# Patient Record
Sex: MALE | Race: ASIAN | URBAN - METROPOLITAN AREA
[De-identification: names, ages, dates, MRNs, and addresses within clinical notes are randomized per-mention and may not be internally consistent; named-entity substitution may affect disease eponyms.]

---

## 2017-06-07 ENCOUNTER — MEDICAL CORRESPONDENCE (OUTPATIENT)
Dept: HEALTH INFORMATION MANAGEMENT | Facility: CLINIC | Age: 22
End: 2017-06-07

## 2017-06-07 ENCOUNTER — TRANSFERRED RECORDS (OUTPATIENT)
Dept: HEALTH INFORMATION MANAGEMENT | Facility: CLINIC | Age: 22
End: 2017-06-07

## 2017-06-19 ENCOUNTER — MEDICAL CORRESPONDENCE (OUTPATIENT)
Dept: HEALTH INFORMATION MANAGEMENT | Facility: CLINIC | Age: 22
End: 2017-06-19

## 2017-06-26 ENCOUNTER — OFFICE VISIT (OUTPATIENT)
Dept: SLEEP MEDICINE | Facility: CLINIC | Age: 22
End: 2017-06-26
Attending: INTERNAL MEDICINE
Payer: COMMERCIAL

## 2017-06-26 VITALS
WEIGHT: 191 LBS | OXYGEN SATURATION: 98 % | RESPIRATION RATE: 18 BRPM | DIASTOLIC BLOOD PRESSURE: 55 MMHG | BODY MASS INDEX: 28.29 KG/M2 | HEART RATE: 71 BPM | HEIGHT: 69 IN | SYSTOLIC BLOOD PRESSURE: 138 MMHG

## 2017-06-26 DIAGNOSIS — R06.83 SNORING: Primary | ICD-10-CM

## 2017-06-26 PROCEDURE — 99211 OFF/OP EST MAY X REQ PHY/QHP: CPT | Mod: ZF
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
## 2017-06-26 NOTE — PATIENT INSTRUCTIONS

## 2017-06-26 NOTE — MR AVS SNAPSHOT
After Visit Summary   6/26/2017    Ely Schulz    MRN: [id number]           Patient Information     Date Of Birth          1995        Visit Information        Provider Department      6/26/2017 12:00 PM Maral Tate MD [facility], [place], Sleep Study        Today's Diagnoses     Snoring    -  1      Care Instructions      Your BMI is Body mass index is 28.21 kg/(m^2).  Weight management is a personal decision.  If you are interested in exploring weight loss strategies, the following discussion covers the approaches that may be successful. Body mass index (BMI) is one way to tell whether you are at a healthy weight, overweight, or obese. It measures your weight in relation to your height.  A BMI of 18.5 to 24.9 is in the healthy range. A person with a BMI of 25 to 29.9 is considered overweight, and someone with a BMI of 30 or greater is considered obese. More than two-thirds of American adults are considered overweight or obese.  Being overweight or obese increases the risk for further weight gain. Excess weight may lead to heart disease and diabetes.  Creating and following plans for healthy eating and physical activity may help you improve your health.  Weight control is part of healthy lifestyle and includes exercise, emotional health, and healthy eating habits. Careful eating habits lifelong are the mainstay of weight control. Though there are significant health benefits from weight loss, long-term weight loss with diet alone may be very difficult to achieve- studies show long-term success with dietary management in less than 10% of people. Attaining a healthy weight may be especially difficult to achieve in those with severe obesity. In some cases, medications, devices and surgical management might be considered.  What can you do?  If you are overweight or obese and are interested in methods for weight loss, you should discuss this with your provider.     Consider  reducing daily calorie intake by 500 calories.     Keep a food journal.     Avoiding skipping meals, consider cutting portions instead.    Diet combined with exercise helps maintain muscle while optimizing fat loss. Strength training is particularly important for building and maintaining muscle mass. Exercise helps reduce stress, increase energy, and improves fitness. Increasing exercise without diet control, however, may not burn enough calories to loose weight.       Start walking three days a week 10-20 minutes at a time    Work towards walking thirty minutes five days a week     Eventually, increase the speed of your walking for 1-2 minutes at time    In addition, we recommend that you review healthy lifestyles and methods for weight loss available through the National Institutes of Health patient information sites:  http://win.niddk.nih.gov/publications/index.htm    And look into health and wellness programs that may be available through your health insurance provider, employer, local community center, or marcela club.    Weight management plan: Patient was referred to their PCP to discuss a diet and exercise plan.              Follow-ups after your visit        Follow-up notes from your care team     Return in about 1 week (around 7/3/2017).      Your next 10 appointments already scheduled     Jul 11, 2017  8:00 PM CDT   PSG Split with SLEEP STUDY RM 2   [facility] [place], Sleep Study ([facility])    [address] [place] [id number] [phone]            Jul 18, 2017 10:00 AM CDT   Return Sleep Patient with Maral Tate MD   [facility], [place], Sleep Study ([facility])    [address] [place] [id number] [phone]              Future tests that were ordered for you today     Open Future Orders        Priority Expected Expires Ordered    Comprehensive Sleep Study Routine   "2017            Who to contact     If you have questions or need follow up information about today's clinic visit or your schedule please contact [facility] directly at [phone].  Normal or non-critical lab and imaging results will be communicated to you by MyChart, letter or phone within 4 business days after the clinic has received the results. If you do not hear from us within 7 days, please contact the clinic through MyChart or phone. If you have a critical or abnormal lab result, we will notify you by phone as soon as possible.  Submit refill requests through [employer] or call your pharmacy and they will forward the refill request to us. Please allow 3 business days for your refill to be completed.          Additional Information About Your Visit        [employer] Information     Mantex lets you send messages to your doctor, view your test results, renew your prescriptions, schedule appointments and more. To sign up, go to [email or web address] . Click on \"Log in\" on the left side of the screen, which will take you to the Welcome page. Then click on \"Sign up Now\" on the right side of the page.     You will be asked to enter the access code listed below, as well as some personal information. Please follow the directions to create your username and password.     Your access code is: [email or web address]  Expires: 2017  1:13 PM     Your access code will  in 90 days. If you need help or a new code, please call your [place] clinic or [phone].        Care EveryWhere ID     This is your Care EveryWhere ID. This could be used by other organizations to access your [place] medical records  [id number]        Your Vitals Were     Pulse Respirations Height Pulse Oximetry BMI (Body Mass Index)       71 18 1.753 m (5' 9\") 98% 28.21 kg/m2        Blood Pressure from Last 3 Encounters:   17 138/55    Weight from Last 3 Encounters:   17 86.6 kg (191 lb)               " Primary Care Provider    None Specified       No primary provider on file.        Equal Access to Services     CAYLA SOUZA : Hadii torrey Wray, tyler handy, holli costa. So [facility] [place] [phone].    ATENCIÓN: Si habla español, tiene a pugh disposición servicios gratuitos de asistencia lingüística. Llame al [phone].    We comply with applicable federal civil rights laws and [place] laws. We do not discriminate on the basis of race, color, national origin, age, disability sex, sexual orientation or gender identity.            Thank you!     Thank you for choosing [facility], SLEEP STUDY  for your care. Our goal is always to provide you with excellent care. Hearing back from our patients is one way we can continue to improve our services. Please take a few minutes to complete the written survey that you may receive in the mail after your visit with us. Thank you!             Your Updated Medication List - Protect others around you: Learn how to safely use, store and throw away your medicines at www.disposemymeds.org.          This list is accurate as of: 6/26/17  1:13 PM.  Always use your most recent med list.                   Brand Name Dispense Instructions for use Diagnosis    cholecalciferol 2000 UNITS tablet      Take 1 tablet by mouth daily 2 tabs per day        MULTIVITAMIN PO      Take by mouth daily 2 tabs per day

## 2017-06-26 NOTE — NURSING NOTE
"Chief Complaint   Patient presents with     Sleep Problem     Headaches through the day,restless sleep       Initial /55  Pulse 71  Resp 18  Ht 1.753 m (5' 9\")  Wt 86.6 kg (191 lb)  SpO2 98%  BMI 28.21 kg/m2 Estimated body mass index is 28.21 kg/(m^2) as calculated from the following:    Height as of this encounter: 1.753 m (5' 9\").    Weight as of this encounter: 86.6 kg (191 lb).  Medication Reconciliation: complete   Neck 40cm  ESS 6/24  Svetlana Varner MA      "

## 2017-07-06 ENCOUNTER — APPOINTMENT (OUTPATIENT)
Dept: GENERAL RADIOLOGY | Facility: CLINIC | Age: 22
End: 2017-07-06
Attending: EMERGENCY MEDICINE
Payer: COMMERCIAL

## 2017-07-06 ENCOUNTER — HOSPITAL ENCOUNTER (EMERGENCY)
Facility: CLINIC | Age: 22
Discharge: HOME | End: 2017-07-07
Attending: EMERGENCY MEDICINE | Admitting: EMERGENCY MEDICINE
Payer: COMMERCIAL

## 2017-07-06 DIAGNOSIS — E87.6 HYPOKALEMIA: ICD-10-CM

## 2017-07-06 DIAGNOSIS — R00.2 PALPITATIONS: ICD-10-CM

## 2017-07-06 LAB
ANION GAP SERPL CALCULATED.3IONS-SCNC: 8 MMOL/L (ref 3–14)
BASOPHILS # BLD AUTO: 0 10E9/L (ref 0–0.2)
BASOPHILS NFR BLD AUTO: 0.1 %
BUN SERPL-MCNC: 16 MG/DL (ref 7–30)
CALCIUM SERPL-MCNC: 9.4 MG/DL (ref 8.5–10.1)
CHLORIDE SERPL-SCNC: 104 MMOL/L (ref 94–109)
CO2 SERPL-SCNC: 25 MMOL/L (ref 20–32)
CREAT SERPL-MCNC: 0.84 MG/DL (ref 0.66–1.25)
DIFFERENTIAL METHOD BLD: NORMAL
EOSINOPHIL # BLD AUTO: 0.1 10E9/L (ref 0–0.7)
EOSINOPHIL NFR BLD AUTO: 1.5 %
ERYTHROCYTE [DISTWIDTH] IN BLOOD BY AUTOMATED COUNT: 11.8 % (ref 10–15)
GFR SERPL CREATININE-BSD FRML MDRD: ABNORMAL ML/MIN/1.7M2
GLUCOSE SERPL-MCNC: 97 MG/DL (ref 70–99)
HCT VFR BLD AUTO: 43.7 % (ref 40–53)
HGB BLD-MCNC: 15.3 G/DL (ref 13.3–17.7)
IMM GRANULOCYTES # BLD: 0 10E9/L (ref 0–0.4)
IMM GRANULOCYTES NFR BLD: 0.1 %
LYMPHOCYTES # BLD AUTO: 2.1 10E9/L (ref 0.8–5.3)
LYMPHOCYTES NFR BLD AUTO: 30.7 %
MCH RBC QN AUTO: 30.5 PG (ref 26.5–33)
MCHC RBC AUTO-ENTMCNC: 35 G/DL (ref 31.5–36.5)
MCV RBC AUTO: 87 FL (ref 78–100)
MONOCYTES # BLD AUTO: 0.4 10E9/L (ref 0–1.3)
MONOCYTES NFR BLD AUTO: 5.6 %
NEUTROPHILS # BLD AUTO: 4.2 10E9/L (ref 1.6–8.3)
NEUTROPHILS NFR BLD AUTO: 62 %
NRBC # BLD AUTO: 0 10*3/UL
NRBC BLD AUTO-RTO: 0 /100
PLATELET # BLD AUTO: 187 10E9/L (ref 150–450)
POTASSIUM SERPL-SCNC: 3 MMOL/L (ref 3.4–5.3)
RBC # BLD AUTO: 5.02 10E12/L (ref 4.4–5.9)
SODIUM SERPL-SCNC: 138 MMOL/L (ref 133–144)
TROPONIN I SERPL-MCNC: NORMAL UG/L (ref 0–0.04)
TSH SERPL DL<=0.005 MIU/L-ACNC: 2.48 MU/L (ref 0.4–4)
WBC # BLD AUTO: 6.8 10E9/L (ref 4–11)

## 2017-07-06 PROCEDURE — 84484 ASSAY OF TROPONIN QUANT: CPT | Performed by: EMERGENCY MEDICINE

## 2017-07-06 PROCEDURE — 84443 ASSAY THYROID STIM HORMONE: CPT | Performed by: EMERGENCY MEDICINE

## 2017-07-06 PROCEDURE — 93010 ELECTROCARDIOGRAM REPORT: CPT | Mod: Z6 | Performed by: EMERGENCY MEDICINE

## 2017-07-06 PROCEDURE — 99285 EMERGENCY DEPT VISIT HI MDM: CPT | Mod: 25 | Performed by: EMERGENCY MEDICINE

## 2017-07-06 PROCEDURE — 25000132 ZZH RX MED GY IP 250 OP 250 PS 637: Performed by: EMERGENCY MEDICINE

## 2017-07-06 PROCEDURE — 93005 ELECTROCARDIOGRAM TRACING: CPT | Performed by: EMERGENCY MEDICINE

## 2017-07-06 PROCEDURE — 80048 BASIC METABOLIC PNL TOTAL CA: CPT | Performed by: EMERGENCY MEDICINE

## 2017-07-06 PROCEDURE — 85025 COMPLETE CBC W/AUTO DIFF WBC: CPT | Performed by: EMERGENCY MEDICINE

## 2017-07-06 PROCEDURE — 71020 XR CHEST 2 VW: CPT

## 2017-07-06 RX ORDER — POTASSIUM CHLORIDE 750 MG/1
40 TABLET, EXTENDED RELEASE ORAL ONCE
Status: COMPLETED | OUTPATIENT
Start: 2017-07-07 | End: 2017-07-07

## 2017-07-06 RX ORDER — LORAZEPAM 0.5 MG/1
1 TABLET ORAL ONCE
Status: COMPLETED | OUTPATIENT
Start: 2017-07-06 | End: 2017-07-06

## 2017-07-06 RX ADMIN — LORAZEPAM 1 MG: 0.5 TABLET ORAL at 23:23

## 2017-07-06 ASSESSMENT — ENCOUNTER SYMPTOMS
BLOOD IN STOOL: 0
PALPITATIONS: 1
SHORTNESS OF BREATH: 1
NUMBNESS: 1
DIAPHORESIS: 1
ANAL BLEEDING: 0
FEVER: 0

## 2017-07-06 NOTE — ED AVS SNAPSHOT
[facility], Emergency Department    [address] [address]    Phone:  [phone]                                       Ely Schulz   MRN: [id number]    Department:  [facility], Emergency Department   Date of Visit:  7/6/2017           Patient Information     Date Of Birth          1995        Your diagnoses for this visit were:     Palpitations        You were seen by Pb Mack MD.        Discharge Instructions       Please call [phone] on Friday to get scheduled for your Holter monitor placement.    Please make an appointment to follow up with [facility] (phone: [phone]) 5-7 days after you turn in the monitor for analysis/results.    Return to the ER for recurrence      Discharge References/Attachments     PALPITATIONS (ENGLISH)      Future Appointments        Provider Department Dept Phone Center    7/11/2017 8:00 PM Sleep Study Room 2 [facility], Sleep Study [phone] [place]    7/18/2017 10:00 AM Maral Tate MD [facility] Sleep Study [phone] [place]      24 Hour Appointment Hotline       To make an appointment at any [place] clinic, call [phone] ([phone]). If you don't have a family doctor or clinic, we will help you find one. [place] clinics are conveniently located to serve the needs of you and your family.          ED Discharge Orders     Zio Patch 48 Hours                    Review of your medicines      Our records show that you are taking the medicines listed below. If these are incorrect, please call your family doctor or clinic.        Dose / Directions Last dose taken    cholecalciferol 2000 UNITS tablet   Dose:  1 tablet        Take 1 tablet by mouth daily 2 tabs per day   Refills:  0        MULTIVITAMIN PO        Take by mouth daily 2 tabs per day   Refills:  0                Procedures and tests performed during your visit     Basic metabolic panel    CBC with platelets  "differential    Cardiac Continuous Monitoring    Drug abuse screen 6 urine (tox)    EKG 12-lead, tracing only    Pulse oximetry nursing    TSH with free T4 reflex    Troponin I    UA reflex to Microscopic and Culture    XR Chest 2 Views      Orders Needing Specimen Collection     None      Pending Results     Date and Time Order Name Status Description    2017 2306 XR Chest 2 Views Preliminary     2017 2246 EKG 12-lead, tracing only Preliminary             Pending Culture Results     No orders found for last 3 day(s).            Pending Results Instructions     If you had any lab results that were not finalized at the time of your Discharge, you can call the ED Lab Result RN at [phone]. You will be contacted by this team for any positive Lab results or changes in treatment. The nurses are available 7 days a week from 10A to 6:30P.  You can leave a message 24 hours per day and they will return your call.        Thank you for choosing [facility]       Thank you for choosing [facility] for your care. Our goal is always to provide you with excellent care. Hearing back from our patients is one way we can continue to improve our services. Please take a few minutes to complete the written survey that you may receive in the mail after you visit with us. Thank you!        [employer] Information     [employer] lets you send messages to your doctor, view your test results, renew your prescriptions, schedule appointments and more. To sign up, go to [email or web address] . Click on \"Log in\" on the left side of the screen, which will take you to the Welcome page. Then click on \"Sign up Now\" on the right side of the page.     You will be asked to enter the access code listed below, as well as some personal information. Please follow the directions to create your username and password.     Your access code is: [email or web address]  Expires: 2017  1:13 PM     Your access code will  in 90 days. If you need help or a new code, " please call your [place] clinic or [phone].        Care EveryWhere ID     This is your Care EveryWhere ID. This could be used by other organizations to access your [place] medical records  [id number]        Equal Access to Services     CAYLA SOUZA : Estelita Wray, wacarrieda luqadaha, qaybta kaalmada gabrielle, holli ga. So [facility] [phone].    ATENCIÓN: Si habla español, tiene a pugh disposición servicios gratuitos de asistencia lingüística. Llame al [phone].    We comply with applicable federal civil rights laws and [place] laws. We do not discriminate on the basis of race, color, national origin, age, disability sex, sexual orientation or gender identity.            After Visit Summary       This is your record. Keep this with you and show to your community pharmacist(s) and doctor(s) at your next visit.

## 2017-07-06 NOTE — ED AVS SNAPSHOT
[facility], [place], Emergency Department    [address] [facility] [address]    Phone:  [phone]                                       Ely Schulz   MRN: [id number]    Department:  [facility], Emergency Department   Date of Visit:  7/6/2017           After Visit Summary Signature Page     I have received my discharge instructions, and my questions have been answered. I have discussed any challenges I see with this plan with the nurse or doctor.    ..........................................................................................................................................  Patient/Patient Representative Signature      ..........................................................................................................................................  Patient Representative Print Name and Relationship to Patient    ..................................................               ................................................  Date                                            Time    ..........................................................................................................................................  Reviewed by Signature/Title    ...................................................              ..............................................  Date                                                            Time

## 2017-07-07 ENCOUNTER — ALLIED HEALTH/NURSE VISIT (OUTPATIENT)
Dept: CARDIOLOGY | Facility: CLINIC | Age: 22
End: 2017-07-07
Attending: EMERGENCY MEDICINE
Payer: COMMERCIAL

## 2017-07-07 VITALS
DIASTOLIC BLOOD PRESSURE: 87 MMHG | SYSTOLIC BLOOD PRESSURE: 132 MMHG | OXYGEN SATURATION: 97 % | RESPIRATION RATE: 14 BRPM | WEIGHT: 193.7 LBS | TEMPERATURE: 97.8 F | HEIGHT: 70 IN | BODY MASS INDEX: 27.73 KG/M2

## 2017-07-07 DIAGNOSIS — R00.2 PALPITATIONS: ICD-10-CM

## 2017-07-07 LAB
ALBUMIN UR-MCNC: NEGATIVE MG/DL
AMPHETAMINES UR QL SCN: NORMAL
APPEARANCE UR: CLEAR
BARBITURATES UR QL: NORMAL
BENZODIAZ UR QL: NORMAL
BILIRUB UR QL STRIP: NEGATIVE
CANNABINOIDS UR QL SCN: NORMAL
COCAINE UR QL: NORMAL
COLOR UR AUTO: ABNORMAL
ETHANOL UR QL SCN: NORMAL
GLUCOSE UR STRIP-MCNC: NEGATIVE MG/DL
HGB UR QL STRIP: NEGATIVE
INTERPRETATION ECG - MUSE: NORMAL
KETONES UR STRIP-MCNC: NEGATIVE MG/DL
LEUKOCYTE ESTERASE UR QL STRIP: NEGATIVE
NITRATE UR QL: NEGATIVE
OPIATES UR QL SCN: NORMAL
PH UR STRIP: 7.5 PH (ref 5–7)
SP GR UR STRIP: 1.01 (ref 1–1.03)
URN SPEC COLLECT METH UR: ABNORMAL
UROBILINOGEN UR STRIP-MCNC: NORMAL MG/DL (ref 0–2)

## 2017-07-07 PROCEDURE — 81003 URINALYSIS AUTO W/O SCOPE: CPT | Performed by: EMERGENCY MEDICINE

## 2017-07-07 PROCEDURE — 80320 DRUG SCREEN QUANTALCOHOLS: CPT | Performed by: EMERGENCY MEDICINE

## 2017-07-07 PROCEDURE — 93225 XTRNL ECG REC<48 HRS REC: CPT

## 2017-07-07 PROCEDURE — 25000132 ZZH RX MED GY IP 250 OP 250 PS 637: Performed by: EMERGENCY MEDICINE

## 2017-07-07 PROCEDURE — 93227 XTRNL ECG REC<48 HR R&I: CPT

## 2017-07-07 PROCEDURE — 80307 DRUG TEST PRSMV CHEM ANLYZR: CPT | Performed by: EMERGENCY MEDICINE

## 2017-07-07 RX ADMIN — POTASSIUM CHLORIDE 40 MEQ: 750 TABLET, EXTENDED RELEASE ORAL at 00:09

## 2017-07-07 NOTE — ED NOTES
Patient presents with c/o SOB and palpitations. Patient reports he was at home talking to girlfriend when symptoms began. Patient reports being seen at [place] for similar symptoms in the past - reports being told it could be anxiety related. HR initially in the 100s during triage, but improved to 90s. Sats 100% on RA.

## 2017-07-07 NOTE — PROGRESS NOTES
Patient has been prescribed a ZioPatch holter for 2 days.  Patient was instructed regarding the indication, function, care and prompt return of the ZioPatch holter monitor. The monitor, with S/N [id number],  was placed on the patient with instructions regarding care of the skin, electrodes, and monitor, as well as documentation in the patient diary. Patient demonstrated understanding of this information and agreed to call i[employer] with further questions or concerns.

## 2017-07-07 NOTE — MR AVS SNAPSHOT
MRN:[id number]                      After Visit Summary   2017    Ely Schulz    MRN: [id number]           Visit Information        Provider Department      2017 2:30 PM MG CV TECH; MG DEVICE  M [facility]        Your next 10 appointments already scheduled     2017  8:00 PM CDT   PSG Split with SLEEP STUDY  2   [facility], [place], Sleep Study ([facility])    [address] [place] [id number] [phone]            2017 10:00 AM CDT   Return Sleep Patient with Maral Tate MD   [facility], [place], Sleep Study ([facility])    [address] [place] [id number] [phone]              MyChart Information     Merku is an electronic gateway that provides easy, online access to your medical records. With Merku, you can request a clinic appointment, read your test results, renew a prescription or communicate with your care team.     To sign up for Merku visit the website at [email or web address]   You will be asked to enter the access code listed below, as well as some personal information. Please follow the directions to create your username and password.     Your access code is: [email or web address]  Expires: 2017  1:13 PM     Your access code will  in 90 days. If you need help or a new code, please contact your [facility] or call [phone] for assistance.        Care EveryWhere ID     This is your Care EveryWhere ID. This could be used by other organizations to access your [place] medical records  [id number]        Equal Access to Services     CAYLA SOUZA : Hadii torrey Wray, tyler handy, qatoan kaalpaul anthony, holli ga. So [facility] [phone].    ATENCIÓN: Si habla español, tiene a pugh disposición servicios gratuitos de  asistencia lingüística. Jr al [phone].    We comply with applicable federal civil rights laws and [place] laws. We do not discriminate on the basis of race, color, national origin, age, disability sex, sexual orientation or gender identity.

## 2017-07-07 NOTE — DISCHARGE INSTRUCTIONS
Please call [phone] on Friday to get scheduled for your Holter monitor placement.    Please make an appointment to follow up with [facility] (phone: [phone]) 5-7 days after you turn in the monitor for analysis/results.    Return to the ER for recurrence

## 2017-07-07 NOTE — ED PROVIDER NOTES
"  History     Chief Complaint   Patient presents with     Shortness of Breath     Palpitations     HPI  Ely Schulz is a 21 year old male who states he was talking with his girlfriend on the phone this evening when he started to feel suddenly short of breath with palpitations and numbness in his chest.  Patient states that his extremities felt numb as well, and he became somewhat diaphoretic.  Patient states he has never had an episode like this before.  Patient states he felt his heart going fast, but did not take his pulse.  Patient denies any recreational drug use. Denies any fevers, melena or bright blood per rectum.    This part of the medical record was transcribed by Stephanie Adam Medical Scribe, from a dictation done by Pb Mack MD.      History reviewed. No pertinent past medical history.    History reviewed. No pertinent surgical history.    No family history on file.    Social History   Substance Use Topics     Smoking status: Former Smoker     Smokeless tobacco: Not on file     Alcohol use Yes      Comment: once a month at most      Details   Multiple Vitamins-Minerals (MULTIVITAMIN PO) Take by mouth daily 2 tabs per day, Historical      cholecalciferol 2000 UNITS tablet Take 1 tablet by mouth daily 2 tabs per day, Historical            No Known Allergies   I have reviewed the Medications, Allergies, Past Medical and Surgical History, and Social History in the Epic system.    Review of Systems   Constitutional: Positive for diaphoresis. Negative for fever.   Respiratory: Positive for shortness of breath.    Cardiovascular: Positive for palpitations.   Gastrointestinal: Negative for anal bleeding and blood in stool.   Neurological: Positive for numbness.   All other systems reviewed and are negative.      Physical Exam   BP: 159/85  Heart Rate: 105  Temp: 97.8  F (36.6  C)  Resp: 16  Height: 177.8 cm (5' 10\")  Weight: 87.9 kg (193 lb 11.2 oz)  SpO2: 100 %  Physical Exam   Constitutional: He is " oriented to person, place, and time.   Alert and conversant and slightly anxious   HENT:   Head: Atraumatic.   Eyes: EOM are normal. Pupils are equal, round, and reactive to light.   Neck: Neck supple.   Cardiovascular: Normal heart sounds.    Pulmonary/Chest: Breath sounds normal.   Abdominal: Soft. There is no tenderness.   Musculoskeletal: He exhibits no edema or tenderness.   Neurological: He is alert and oriented to person, place, and time. No cranial nerve deficit.   Skin: Skin is warm.   Psychiatric:   Slightly anxious       ED Course     ED Course     Procedures          EKG revealed a normal sinus rhythm at a rate of 90 with a periumbilical 0.164 and a QRS duration of 0.096.  The patient had a normal axis with no acute ST or T-wave changes significant for ischemia.  There are no short intervals to suggest preexcitation syndrome.  This is read by me personally.    Results for orders placed or performed during the hospital encounter of 07/06/17   XR Chest 2 Views    Impression    Impression: Clear chest.   CBC with platelets differential   Result Value Ref Range    WBC 6.8 4.0 - 11.0 10e9/L    RBC Count 5.02 4.4 - 5.9 10e12/L    Hemoglobin 15.3 13.3 - 17.7 g/dL    Hematocrit 43.7 40.0 - 53.0 %    MCV 87 78 - 100 fl    MCH 30.5 26.5 - 33.0 pg    MCHC 35.0 31.5 - 36.5 g/dL    RDW 11.8 10.0 - 15.0 %    Platelet Count 187 150 - 450 10e9/L    Diff Method Automated Method     % Neutrophils 62.0 %    % Lymphocytes 30.7 %    % Monocytes 5.6 %    % Eosinophils 1.5 %    % Basophils 0.1 %    % Immature Granulocytes 0.1 %    Nucleated RBCs 0 0 /100    Absolute Neutrophil 4.2 1.6 - 8.3 10e9/L    Absolute Lymphocytes 2.1 0.8 - 5.3 10e9/L    Absolute Monocytes 0.4 0.0 - 1.3 10e9/L    Absolute Eosinophils 0.1 0.0 - 0.7 10e9/L    Absolute Basophils 0.0 0.0 - 0.2 10e9/L    Abs Immature Granulocytes 0.0 0 - 0.4 10e9/L    Absolute Nucleated RBC 0.0    TSH with free T4 reflex   Result Value Ref Range    TSH 2.48 0.40 - 4.00 mU/L    Troponin I   Result Value Ref Range    Troponin I ES  0.000 - 0.045 ug/L     <0.015  The 99th percentile for upper reference range is 0.045 ug/L.  Troponin values in   the range of 0.045 - 0.120 ug/L may be associated with risks of adverse   clinical events.     Basic metabolic panel   Result Value Ref Range    Sodium 138 133 - 144 mmol/L    Potassium 3.0 (L) 3.4 - 5.3 mmol/L    Chloride 104 94 - 109 mmol/L    Carbon Dioxide 25 20 - 32 mmol/L    Anion Gap 8 3 - 14 mmol/L    Glucose 97 70 - 99 mg/dL    Urea Nitrogen 16 7 - 30 mg/dL    Creatinine 0.84 0.66 - 1.25 mg/dL    GFR Estimate >90  Non  GFR Calc   >60 mL/min/1.7m2    GFR Estimate If Black >90   GFR Calc   >60 mL/min/1.7m2    Calcium 9.4 8.5 - 10.1 mg/dL   Drug abuse screen 6 urine (tox)   Result Value Ref Range    Amphetamine Qual Urine  NEG     Negative   Cutoff for a negative amphetamine is 500 ng/mL or less.      Barbiturates Qual Urine  NEG     Negative   Cutoff for a negative barbiturate is 200 ng/mL or less.      Benzodiazepine Qual Urine  NEG     Negative   Cutoff for a negative benzodiazepine is 200 ng/mL or less.      Cannabinoids Qual Urine  NEG     Negative   Cutoff for a negative cannabinoid is 50 ng/mL or less.      Cocaine Qual Urine  NEG     Negative   Cutoff for a negative cocaine is 300 ng/mL or less.      Ethanol Qual Urine  NEG     Negative   Cutoff for a negative urine ethanol is 0.05 g/dL or less      Opiates Qualitative Urine  NEG     Negative   Cutoff for a negative opiate is 300 ng/mL or less.     UA reflex to Microscopic and Culture   Result Value Ref Range    Color Urine Light Yellow     Appearance Urine Clear     Glucose Urine Negative NEG mg/dL    Bilirubin Urine Negative NEG    Ketones Urine Negative NEG mg/dL    Specific Gravity Urine 1.006 1.003 - 1.035    Blood Urine Negative NEG    pH Urine 7.5 (H) 5.0 - 7.0 pH    Protein Albumin Urine Negative NEG mg/dL    Urobilinogen mg/dL Normal 0.0 - 2.0  mg/dL    Nitrite Urine Negative NEG    Leukocyte Esterase Urine Negative NEG    Source Midstream Urine    EKG 12-lead, tracing only   Result Value Ref Range    Interpretation ECG Click View Image link to view waveform and result        Labs Ordered and Resulted from Time of ED Arrival Up to the Time of Departure from the ED   BASIC METABOLIC PANEL - Abnormal; Notable for the following:        Result Value    Potassium 3.0 (*)     All other components within normal limits   UA MACROSCOPIC WITH REFLEX TO MICRO AND CULTURE - Abnormal; Notable for the following:     pH Urine 7.5 (*)     All other components within normal limits   CBC WITH PLATELETS DIFFERENTIAL   TSH WITH FREE T4 REFLEX   TROPONIN I   DRUG ABUSE SCREEN 6 CHEM DEP URINE ([facility])   CARDIAC CONTINUOUS MONITORING   PULSE OXIMETRY NURSING       Assessments & Plan (with Medical Decision Making)     I have reviewed the nursing notes.    Patient presents with what on the surface seems to be a panic attack and anxiety episode but has no previous history of these.  Workup so far is pretty much negative but an underlying tachyarrhythmia cannot be ruled out.  Also in the differential is transient hypertension.  At this time patient will be sent home to come back tomorrow for a Zio patch that he can wear at home over the weekend.    I have reviewed the findings, diagnosis, plan and need for follow up with the patient.  Medications   LORazepam (ATIVAN) tablet 1 mg (1 mg Oral Given 7/6/17 4173)   potassium chloride SA (K-DUR/KLOR-CON M) CR tablet 40 mEq (40 mEq Oral Given 7/7/17 0009)       Final diagnoses:   Palpitations   Hypokalemia     Please call [phone] on Friday to get scheduled for your Holter monitor placement.    Please make an appointment to follow up with [facility] (phone: [phone]) 5-7 days after you turn in the monitor for analysis/results.    Return to the ER for recurrence    Routine discharge instructions were given for this  diagnosis.    Pb Mack MD    7/6/2017   [facility], [place], EMERGENCY DEPARTMENT     Pb Mack MD  07/07/17 0110

## 2017-07-10 ENCOUNTER — THERAPY VISIT (OUTPATIENT)
Dept: SLEEP MEDICINE | Facility: CLINIC | Age: 22
End: 2017-07-10
Attending: INTERNAL MEDICINE
Payer: COMMERCIAL

## 2017-07-10 DIAGNOSIS — R06.83 SNORING: ICD-10-CM

## 2017-07-10 PROCEDURE — 95810 POLYSOM 6/> YRS 4/> PARAM: CPT | Mod: ZF

## 2017-07-10 NOTE — MR AVS SNAPSHOT
After Visit Summary   7/10/2017    Ely Schulz    MRN: [id number]           Patient Information     Date Of Birth          1995        Visit Information        Provider Department      7/10/2017 8:00 PM SLEEP STUDY RM 1 [facility], [place], Sleep Study        Today's Diagnoses     Snoring          Care Instructions    [place] SLEEP [facility]    1. Your sleep study will be reviewed by a sleep physician within the next few days.     2. Please follow up in the sleep clinic as scheduled, or, make an appointment with your sleep provider to be seen within two weeks to discuss the results of the sleep study.    3. If you have any questions or problems with your treatment plan, please contact your sleep clinic provider at [phone] to further manage your condition.    4. Please review your attached medication list, and, at your follow-up appointment advise your sleep clinic provider about any changes.    5. Go to [email or web address] for more information about your sleep problems.    Guicho Boyer, [facility] Center[facility]  July 11, 2017                Follow-ups after your visit        Your next 10 appointments already scheduled     Jul 18, 2017 10:00 AM CDT   Return Sleep Patient with Sntho Tate MD   [facility] [place], Sleep Study ([facility])    [address] [place] [id number] [phone]              Who to contact     If you have questions or need follow up information about today's clinic visit or your schedule please contact [facility], [facility] directly at [phone].  Normal or non-critical lab and imaging results will be communicated to you by MyChart, letter or phone within 4 business days after the clinic has received the results. If you do not hear from us within 7 days, please contact the clinic through MyChart or phone. If you have a critical or abnormal lab result, we will notify you by phone as  "soon as possible.  Submit refill requests through Fixstars or call your pharmacy and they will forward the refill request to us. Please allow 3 business days for your refill to be completed.          Additional Information About Your Visit        Fast DrinksharTapFame Information     Fixstars lets you send messages to your doctor, view your test results, renew your prescriptions, schedule appointments and more. To sign up, go to [email or web address] . Click on \"Log in\" on the left side of the screen, which will take you to the Welcome page. Then click on \"Sign up Now\" on the right side of the page.     You will be asked to enter the access code listed below, as well as some personal information. Please follow the directions to create your username and password.     Your access code is: [email or web address]  Expires: 2017  1:13 PM     Your access code will  in 90 days. If you need help or a new code, please call your [place] clinic or [phone].        Care EveryWhere ID     This is your Care EveryWhere ID. This could be used by other organizations to access your [place] medical records  [id number]         Blood Pressure from Last 3 Encounters:   17 132/87   17 138/55    Weight from Last 3 Encounters:   17 87.9 kg (193 lb 11.2 oz)   17 86.6 kg (191 lb)              We Performed the Following     Comprehensive Sleep Study        Primary Care Provider    Physician No Ref-Primary       No address on file        Equal Access to Services     CAYLA SOUZA : Hadii torrey fordo Sojacqui, waaxda luqadaha, qaybta kaalmada gabrielle, holli dugan . So [facility] [phone].    ATENCIÓN: Si habla español, tiene a pugh disposición servicios gratuitos de asistencia lingüística. Llame al [phone].    We comply with applicable federal civil rights laws and [place] laws. We do not discriminate on the basis of race, color, national origin, age, disability sex, sexual orientation or " gender identity.            Thank you!     [facility]  for your care. Our goal is always to provide you with excellent care. Hearing back from our patients is one way we can continue to improve our services. Please take a few minutes to complete the written survey that you may receive in the mail after your visit with us. Thank you!             Your Updated Medication List - Protect others around you: Learn how to safely use, store and throw away your medicines at www.disposemymeds.org.          This list is accurate as of: 7/10/17 11:59 PM.  Always use your most recent med list.                   Brand Name Dispense Instructions for use Diagnosis    cholecalciferol 2000 UNITS tablet      Take 1 tablet by mouth daily 2 tabs per day        MULTIVITAMIN PO      Take by mouth daily 2 tabs per day

## 2017-07-11 NOTE — PATIENT INSTRUCTIONS
[place] SLEEP [facility]    1. Your sleep study will be reviewed by a sleep physician within the next few days.     2. Please follow up in the sleep clinic as scheduled, or, make an appointment with your sleep provider to be seen within two weeks to discuss the results of the sleep study.    3. If you have any questions or problems with your treatment plan, please contact your sleep clinic provider at [phone] to further manage your condition.    4. Please review your attached medication list, and, at your follow-up appointment advise your sleep clinic provider about any changes.    5. Go to [email or web address] for more information about your sleep problems.    Guicho Boyer, RPSGT  July 11, 2017

## 2017-07-12 NOTE — PROCEDURES
" SLEEP STUDY INTERPRETATION  POLYSOMNOGRAPHY REPORT      Patient: Ely Schulz  YOB: 1995  Study Date: 7/10/2017  MRN: [id number]  Referring Provider: Nora Savage MD  Ordering Provider: Dr. Kj MD    Indications for Polysomnography: The patient is a 21 y old male who is 5' 9\" and weighs 191.0 lbs.  His BMI is 28.3, Hulett sleepiness scale 6.0 and neck size is 40.0.  Relevant medical history includes delayed sleep phase. A diagnostic polysomnogram was performed to evaluate for symptoms suggestive of sleep apnea.    Polysomnogram Data:  A full night polysomnogram recorded the standard physiologic parameters including EEG, EOG, EMG, ECG, nasal and oral airflow.  Respiratory parameters of chest and abdominal movements were recorded with respiratory inductance plethysmography.  Oxygen saturation was recorded by pulse oximetry.      Sleep Architecture: Normal; sleep onset at 23:03.   The total recording time of the polysomnogram was 435.1 minutes.  The total sleep time was 399.5 minutes.  Sleep latency was normal at 7.8 minutes without the use of a sleep aid.  REM latency was 75.0 minutes.  Arousal index was normal at 12.8 arousals per hour.  Sleep efficiency was normal at 91.8%.  Wake after sleep onset was 25.5 minutes.  The patient spent 3.4% of total sleep time in Stage N1, 59.1% in Stage N2, 13.9% in Stages N3, and 23.7% in REM.  Time in REM supine was 94.5 minutes.    Respiration: Normal.    Events - The polysomnogram revealed a presence of 3 obstructive, 15 central, and 1 mixed apneas resulting in an apnea index of 2.9 events per hour.  There were 1 hypopneas resulting in a hypopnea index of 0.2 events per hour.  The combined apnea/hypopnea index was 3.0 events per hour.  The REM AHI was 2.5 events per hour.  The supine AHI was 3.0 events per hour.  The RERA index was 2.3 events per hour.   The RDI was 5.3 events per hour.    Snoring - was reported as mild.     Respiratory rate and " pattern - was notable for normal respiratory rate and pattern.    Sustained Sleep Associated Hypoventilation - Transcutaneous carbon dioxide monitoring was / was not used, however significant hypoventilation was not suggested by oximetry.    Sleep Associated Hypoxemia - (Greater than 5 minutes O2 sat below 89%) was not present.  Baseline oxygen saturation was 96.5%. Lowest oxygen saturation was 85.2%.  Time spent less than or equal to 88% was 0.4 minutes.  Time spent less than or equal to 89% was 0.4 minutes.  5.3 2.3 3.0     Movement Activity: No abnormal sleep movements.     Periodic Limb Activity - There were 12 PLMs during the entire study. The PLM index was 1.8 movements per hour.  The PLM Arousal Index was 0.5 per hour.    REM EMG Activity - Excessive transient / sustained muscle activity was not present.    Nocturnal Behavior - Abnormal sleep related behaviors were not noted.    Bruxism - None apparent.    Cardiac Summary: Normal sinus rhythm.  The average pulse rate was 61.0 bpm.  The minimum pulse rate was 45.9 bpm while the maximum pulse rate was 95.8 bpm. The rhythm is normal sinus. Arrhythmias were not noted.      Assessment:     Normal sleep study.     Recommendations:    Suggest optimizing sleep schedule and avoiding sleep deprivation.    Electronically-Signed by Saroj Saleh 13:38  7/12/17

## 2017-07-18 ENCOUNTER — OFFICE VISIT (OUTPATIENT)
Dept: SLEEP MEDICINE | Facility: CLINIC | Age: 22
End: 2017-07-18
Attending: INTERNAL MEDICINE
Payer: COMMERCIAL

## 2017-07-18 VITALS
DIASTOLIC BLOOD PRESSURE: 61 MMHG | HEIGHT: 70 IN | OXYGEN SATURATION: 97 % | SYSTOLIC BLOOD PRESSURE: 117 MMHG | RESPIRATION RATE: 16 BRPM | BODY MASS INDEX: 27.92 KG/M2 | HEART RATE: 78 BPM | WEIGHT: 195 LBS

## 2017-07-18 DIAGNOSIS — G47.21 DELAYED SLEEP PHASE SYNDROME: ICD-10-CM

## 2017-07-18 DIAGNOSIS — R06.83 SNORING: Primary | ICD-10-CM

## 2017-07-18 DIAGNOSIS — F51.12 BEHAVIORALLY INDUCED INSUFFICIENT SLEEP SYNDROME: ICD-10-CM

## 2017-07-18 PROCEDURE — 99211 OFF/OP EST MAY X REQ PHY/QHP: CPT | Mod: ZF

## 2017-07-18 NOTE — NURSING NOTE
"Chief Complaint   Patient presents with     Results     Follow up to discuss PSG results       Initial /61  Pulse 78  Resp 16  Ht 1.778 m (5' 10\")  Wt 88.5 kg (195 lb)  SpO2 97%  BMI 27.98 kg/m2 Estimated body mass index is 27.98 kg/(m^2) as calculated from the following:    Height as of this encounter: 1.778 m (5' 10\").    Weight as of this encounter: 88.5 kg (195 lb).  Medication Reconciliation: complete     ZI Hanson        "

## 2017-07-18 NOTE — MR AVS SNAPSHOT
After Visit Summary   7/18/2017    Ely Schulz    MRN: [id number]           Patient Information     Date Of Birth          1995        Visit Information        Provider Department      7/18/2017 10:00 AM Maral Tate MD [facility] [place], Sleep Study        Care Instructions      Your BMI is Body mass index is 27.98 kg/(m^2).  Weight management is a personal decision.  If you are interested in exploring weight loss strategies, the following discussion covers the approaches that may be successful. Body mass index (BMI) is one way to tell whether you are at a healthy weight, overweight, or obese. It measures your weight in relation to your height.  A BMI of 18.5 to 24.9 is in the healthy range. A person with a BMI of 25 to 29.9 is considered overweight, and someone with a BMI of 30 or greater is considered obese. More than two-thirds of American adults are considered overweight or obese.  Being overweight or obese increases the risk for further weight gain. Excess weight may lead to heart disease and diabetes.  Creating and following plans for healthy eating and physical activity may help you improve your health.  Weight control is part of healthy lifestyle and includes exercise, emotional health, and healthy eating habits. Careful eating habits lifelong are the mainstay of weight control. Though there are significant health benefits from weight loss, long-term weight loss with diet alone may be very difficult to achieve- studies show long-term success with dietary management in less than 10% of people. Attaining a healthy weight may be especially difficult to achieve in those with severe obesity. In some cases, medications, devices and surgical management might be considered.  What can you do?  If you are overweight or obese and are interested in methods for weight loss, you should discuss this with your provider.     Consider reducing daily calorie intake by 500 calories.      Keep a food journal.     Avoiding skipping meals, consider cutting portions instead.    Diet combined with exercise helps maintain muscle while optimizing fat loss. Strength training is particularly important for building and maintaining muscle mass. Exercise helps reduce stress, increase energy, and improves fitness. Increasing exercise without diet control, however, may not burn enough calories to loose weight.       Start walking three days a week 10-20 minutes at a time    Work towards walking thirty minutes five days a week     Eventually, increase the speed of your walking for 1-2 minutes at time    In addition, we recommend that you review healthy lifestyles and methods for weight loss available through the National Institutes of Health patient information sites:  http://win.niddk.nih.gov/publications/index.htm    And look into health and wellness programs that may be available through your health insurance provider, employer, local community center, or [employer].    Weight management plan: Patient was referred to their PCP to discuss a diet and exercise plan.              Follow-ups after your visit        Who to contact     If you have questions or need follow up information about today's clinic visit or your schedule please contact [facility] directly at [phone].  Normal or non-critical lab and imaging results will be communicated to you by [employer], letter or phone within 4 business days after the clinic has received the results. If you do not hear from us within 7 days, please contact the clinic through [employer] or phone. If you have a critical or abnormal lab result, we will notify you by phone as soon as possible.  Submit refill requests through [employer] or call your pharmacy and they will forward the refill request to us. Please allow 3 business days for your refill to be completed.          Additional Information About Your Visit        [employer] Information     [employer] lets you  "send messages to your doctor, view your test results, renew your prescriptions, schedule appointments and more. To sign up, go to [email or web address] . Click on \"Log in\" on the left side of the screen, which will take you to the Welcome page. Then click on \"Sign up Now\" on the right side of the page.     You will be asked to enter the access code listed below, as well as some personal information. Please follow the directions to create your username and password.     Your access code is: [email or web address]  Expires: 2017  1:13 PM     Your access code will  in 90 days. If you need help or a new code, please call your [place] clinic or [phone].        Care EveryWhere ID     This is your Care EveryWhere ID. This could be used by other organizations to access your [facility] medical records  [id number]        Your Vitals Were     Pulse Respirations Height Pulse Oximetry BMI (Body Mass Index)       78 16 1.778 m (5' 10\") 97% 27.98 kg/m2        Blood Pressure from Last 3 Encounters:   17 117/61   17 132/87   17 138/55    Weight from Last 3 Encounters:   17 88.5 kg (195 lb)   17 87.9 kg (193 lb 11.2 oz)   17 86.6 kg (191 lb)              Today, you had the following     No orders found for display       Primary Care Provider    Physician No Ref-Primary       No address on file        Equal Access to Services     AMADA SOUZA : Hadii torrey ku hadasho Soomaali, waaxda luqadaha, qaybta kaalmada adeegyada, holli dugan . So [facility] [phone].    ATENCIÓN: Si habla español, tiene a pugh disposición servicios gratuitos de asistencia lingüística. Llame al [phone].    We comply with applicable federal civil rights laws and [place] laws. We do not discriminate on the basis of race, color, national origin, age, disability sex, sexual orientation or gender identity.            Thank you!     Thank you for choosing [facility]  for your " care. Our goal is always to provide you with excellent care. Hearing back from our patients is one way we can continue to improve our services. Please take a few minutes to complete the written survey that you may receive in the mail after your visit with us. Thank you!             Your Updated Medication List - Protect others around you: Learn how to safely use, store and throw away your medicines at www.disposemymeds.org.          This list is accurate as of: 7/18/17 10:10 AM.  Always use your most recent med list.                   Brand Name Dispense Instructions for use Diagnosis    cholecalciferol 2000 UNITS tablet      Take 1 tablet by mouth daily 2 tabs per day        MULTIVITAMIN PO      Take by mouth daily 2 tabs per day

## 2017-07-18 NOTE — PATIENT INSTRUCTIONS

## 2017-07-19 NOTE — PROGRESS NOTES
"Sleep medicine f/u note    Date of visit: 7/18/17    Chief complaint: review results of diagnostic Polysomnography    HPI: Mr.Yousan Schulz  is a 21 yr old male  who presents to Sleep clinic to review results of recently obtained diagnostic Polysomnography(date:7/10/17)    PSG:  Sleep Architecture:The total recording time of the polysomnogram was 435.1 minutes.  The total sleep time was 399.5 minutes.  Sleep latency was reduced at 7.8 minutes without the use of a sleep aid.  REM latency was reduced at 75.0 minutes.   Sleep efficiency and arousal index were normal. All stages of sleep were observed.  Stages: N1: 3.4 %, N2: 59.1 %, N3: 13.9 % and  REM : 23.7 %(fairly normal distribution).  Respiration: snoring was reported as mild and intermittent. There was no evidence of clinically significant sleep apnea. Total AHI: 3.0 per hour; RDI: 5.3 per hour.   BaselineO2 saturation :96.5 %; Lowest O2 saturation:85.2  % . Sleep associated hypoxemia was not present.  There were very few PLMs and there were non abnormal nocturnal behaviors/cardiac arrhythmias.  The results were discussed with pt in detail.  He reports that since his  last sleep clinic visit he has been trying  to go to bed around 12 MN and waking up around 7AM. During weekends he  goes to bed late. He has not been experiencing drowsiness while driving lately.  Current meds, Past medical history, Past surgical history, Allergies, Social history, Family history: reviewed, per EMR  Exam:  VITAL SIGNS:/61  Pulse 78  Resp 16  Ht 1.778 m (5' 10\")  Wt 88.5 kg (195 lb)  SpO2 97%  BMI 27.98 kg/m2  General appearance:  in no apparent distress  Pt is dressed casually, cooperative with good eye contact.   Speech is spontaneous with regular rate and volume.   Mood: euthymic; affect congruent with full range and intensity.   Sensorium: awake, alert and oriented to person, place, time, and situation.    ASSESSMENT/PLAN:  1. Snoring, no evidence of clinically " "significant sleep apnea. We discussed the option of dental appliance through referral to dentistry. He was not interested.  2. Reduced REM latency and good amounts of  REM are suggestive of possible sleep deprivation. Patient did report that he did not get enough sleep the few nights preceding the sleep study.   He   has delayed sleep phase. We discussed optimizing sleep hygiene measures and following regular sleep wake pattern, aim at obtaining at least 7-8 hours of sleep per night and avoiding sleep deprivation. We arrived at a goal bed time of 12 MN  and goal wake time of 8AM.  He was instructed to avoid naps during daytime, avoid exposure to bright light past 8 pm and avoid mind stimulating activities past 10:30 pm, encouraged to practice relaxation techniques before bed.  We discussed exposure to bright light, after 10 AM either  natural outdoor light or using bright light box with 10,000 lux for 30-60 minutes to help with advancement of the sleep phase. He does not want to take melatonin.  3. Patient was counseled on avoiding  driving /operating heavy machinery,  if drowsy or sleepy.  If  the symptoms of EDS do not improve despite regularization of sleep wake pattern and obtaining at least 7-8 hours of sleep per night,  further evaluation  to check for pathological sleepiness is warranted.      F/u at sleep clinic as needed.     \"I spent a total of 25 minutes  face to face with Ely Schulz during today's office visit. Over 50% of this time was spent counseling the patient and/or coordinating care regarding  regularization of sleep wake pattern and  avoiding sleep deprivation.    Maral Tate MD   of Medicine,  Division of Pulmonary/Sleep Medicine  [facility].        "